# Patient Record
Sex: MALE | Race: WHITE | NOT HISPANIC OR LATINO | Employment: UNEMPLOYED | URBAN - METROPOLITAN AREA
[De-identification: names, ages, dates, MRNs, and addresses within clinical notes are randomized per-mention and may not be internally consistent; named-entity substitution may affect disease eponyms.]

---

## 2017-01-23 ENCOUNTER — HOSPITAL ENCOUNTER (EMERGENCY)
Facility: HOSPITAL | Age: 22
Discharge: HOME | End: 2017-01-23
Attending: EMERGENCY MEDICINE | Admitting: EMERGENCY MEDICINE

## 2017-01-23 VITALS
OXYGEN SATURATION: 97 % | BODY MASS INDEX: 21 KG/M2 | DIASTOLIC BLOOD PRESSURE: 84 MMHG | SYSTOLIC BLOOD PRESSURE: 117 MMHG | WEIGHT: 150 LBS | HEIGHT: 71 IN | TEMPERATURE: 96.9 F | RESPIRATION RATE: 18 BRPM | HEART RATE: 74 BPM

## 2017-01-23 DIAGNOSIS — T50.901A OVERDOSE, ACCIDENTAL OR UNINTENTIONAL, INITIAL ENCOUNTER: Primary | ICD-10-CM

## 2017-01-23 PROCEDURE — 99283 EMERGENCY DEPT VISIT LOW MDM: CPT

## 2017-01-23 RX ORDER — UREA 10 %
3 LOTION (ML) TOPICAL NIGHTLY
COMMUNITY

## 2017-01-23 NOTE — ED PROVIDER NOTES
EMERGENCY DEPARTMENT ENCOUNTER    CHIEF COMPLAINT  Chief Complaint: Melatonin Overdose  History given by: Pt  History limited by: None  Room Number: 11/11  PMD: Wil Murcia MD      HPI:  Pt is a 21 y.o. male who presents complaining of overdose on melatonin. Pt reports he was trying to go to sleep quicker. He denies SI. He reports he had light-headedness and weakness which have now resolved.     Duration:  Few hours  Onset: gradual  Timing: constant  Location: n/a  Radiation: n/a  Quality: melatonin  Intensity/Severity: moderate  Progression: unchanged  Associated Symptoms: light-headedness, weakness (now resolved)  Aggravating Factors: none  Alleviating Factors: none  Previous Episodes: none  Treatment before arrival: none    PAST MEDICAL HISTORY  Active Ambulatory Problems     Diagnosis Date Noted   • No Active Ambulatory Problems     Resolved Ambulatory Problems     Diagnosis Date Noted   • No Resolved Ambulatory Problems     Past Medical History   Diagnosis Date   • ADD (attention deficit disorder)    • ADHD (attention deficit hyperactivity disorder)        PAST SURGICAL HISTORY  Past Surgical History   Procedure Laterality Date   • Knee surgery Left        FAMILY HISTORY  History reviewed. No pertinent family history.    SOCIAL HISTORY  Social History     Social History   • Marital status: Single     Spouse name: N/A   • Number of children: N/A   • Years of education: N/A     Occupational History   • Not on file.     Social History Main Topics   • Smoking status: Current Every Day Smoker     Packs/day: 1.00     Types: Cigarettes   • Smokeless tobacco: Not on file   • Alcohol use Yes      Comment: social   • Drug use: No   • Sexual activity: Yes     Other Topics Concern   • Not on file     Social History Narrative   • No narrative on file       ALLERGIES  Review of patient's allergies indicates no known allergies.    REVIEW OF SYSTEMS  Review of Systems   Constitutional: Negative for activity change,  appetite change and fever.   HENT: Negative for congestion and sore throat.    Eyes: Negative.    Respiratory: Negative for cough and shortness of breath.    Cardiovascular: Negative for chest pain and leg swelling.   Gastrointestinal: Negative for abdominal pain, diarrhea and vomiting.   Endocrine: Negative.    Genitourinary: Negative for decreased urine volume and dysuria.   Musculoskeletal: Negative for neck pain.   Skin: Negative for rash and wound.   Allergic/Immunologic: Negative.    Neurological: Positive for weakness ( resolved) and light-headedness ( resolved). Negative for numbness and headaches.   Hematological: Negative.    Psychiatric/Behavioral: Negative.    All other systems reviewed and are negative.      PHYSICAL EXAM  ED Triage Vitals   Temp Pulse Resp BP SpO2   -- -- -- -- --             Temp src Heart Rate Source Patient Position BP Location FiO2 (%)   -- -- -- -- --              Physical Exam   Constitutional: He is oriented to person, place, and time and well-developed, well-nourished, and in no distress.   HENT:   Head: Normocephalic and atraumatic.   Eyes: EOM are normal. Pupils are equal, round, and reactive to light.   Neck: Normal range of motion. Neck supple.   Cardiovascular: Normal rate, regular rhythm and normal heart sounds.    Pulmonary/Chest: Effort normal and breath sounds normal. No respiratory distress.   Abdominal: Soft. There is no tenderness. There is no rebound and no guarding.   Musculoskeletal: Normal range of motion. He exhibits no edema.   Neurological: He is alert and oriented to person, place, and time. He has normal sensation and normal strength.   Skin: Skin is warm and dry.   Psychiatric: Affect normal. His mood appears anxious ( mild).   Nursing note and vitals reviewed.      LAB RESULTS  Lab Results (last 24 hours)     ** No results found for the last 24 hours. **          RADIOLOGY  No orders to display        PROCEDURES  Procedures      PROGRESS AND CONSULTS  ED  Course     0420  Spoke with Poison control. They do not expect any toxic side affects with the dose he took.      MEDICAL DECISION MAKING  Results were reviewed/discussed with the patient and they were also made aware of online access. Pt also made aware that some labs, such as cultures, will not be resulted during ER visit and follow up with PMD is necessary.     MDM  Number of Diagnoses or Management Options  Overdose, accidental or unintentional, initial encounter:   Patient Progress  Patient progress: stable         DIAGNOSIS  Final diagnoses:   Overdose, accidental or unintentional, initial encounter       DISPOSITION  DISCHARGE    Patient discharged in stable condition.    Reviewed implications of results, diagnosis, meds, responsibility to follow up, warning signs and symptoms of possible worsening, potential complications and reasons to return to ER.    Patient/Family voiced understanding of above instructions.    Discussed plan for discharge, as there is no emergent indication for admission.  Pt/family is agreeable and understands need for follow up and repeat testing.  Pt is aware that discharge does not mean that nothing is wrong but it indicates no emergency is present that requires admission and they must continue care with follow-up as given below or physician of their choice.     FOLLOW-UP  [facility] REFERR[facility]  [facility] [id number]  [phone]  Schedule an appointment as soon as possible for a visit           Medication List      Notice     No changes were made to your prescriptions during this visit.            Latest Documented Vital Signs:  As of 6:02 AM  BP- 117/84 HR- 74 Temp- 96.9 °F (36.1 °C) (Tympanic) O2 sat- 97%    --  Documentation assistance provided by wyatt Shelley for Dr. Kal Brantley.  Information recorded by the wyatt was done at my direction and has been verified and validated by me.     Yuridia Shelley  01/23/17 0432       Kal Brantley,  MD  01/23/17 0454       Kal Brantley MD  01/23/17 0602

## 2017-01-23 NOTE — ED PROVIDER NOTES
Subjective   History of Present Illness    Review of Systems    Past Medical History   Diagnosis Date   • ADD (attention deficit disorder)    • ADHD (attention deficit hyperactivity disorder)        No Known Allergies    Past Surgical History   Procedure Laterality Date   • Knee surgery Left        History reviewed. No pertinent family history.    Social History     Social History   • Marital status: Single     Spouse name: N/A   • Number of children: N/A   • Years of education: N/A     Social History Main Topics   • Smoking status: Current Every Day Smoker     Packs/day: 1.00     Types: Cigarettes   • Smokeless tobacco: None   • Alcohol use Yes      Comment: social   • Drug use: No   • Sexual activity: Yes     Other Topics Concern   • None     Social History Narrative   • None         Objective   Physical Exam    Procedures         ED Course  ED Course                     MDM    Final diagnoses:   Overdose, accidental or unintentional, initial encounter       Documentation assistance provided by wyatt Shelley.  Information recorded by the wyatt was done at my direction and has been verified and validated by me.     Yuridia Shelley  01/23/17 0500       Kal Brantley MD  01/23/17 0603